# Patient Record
Sex: FEMALE | Race: WHITE | NOT HISPANIC OR LATINO | Employment: FULL TIME | URBAN - METROPOLITAN AREA
[De-identification: names, ages, dates, MRNs, and addresses within clinical notes are randomized per-mention and may not be internally consistent; named-entity substitution may affect disease eponyms.]

---

## 2023-11-23 PROBLEM — Z80.41 FAMILY HISTORY OF OVARIAN CANCER: Status: ACTIVE | Noted: 2023-11-23

## 2023-11-23 PROBLEM — Z80.0 FAMILY HISTORY OF COLON CANCER: Status: ACTIVE | Noted: 2023-11-23

## 2023-11-23 PROBLEM — Z01.419 ENCOUNTER FOR ANNUAL ROUTINE GYNECOLOGICAL EXAMINATION: Status: ACTIVE | Noted: 2023-11-23

## 2023-11-23 ASSESSMENT — ENCOUNTER SYMPTOMS
BACK PAIN: 0
CHILLS: 0
AGITATION: 0
FATIGUE: 0
COUGH: 0
FEVER: 0
CONSTIPATION: 0
FREQUENCY: 0
FLANK PAIN: 0
DYSURIA: 0
NAUSEA: 0
BLOOD IN STOOL: 0
DYSPHORIC MOOD: 0
DIFFICULTY URINATING: 0
DIZZINESS: 0
NERVOUS/ANXIOUS: 0
HEADACHES: 0
CHEST TIGHTNESS: 0
SLEEP DISTURBANCE: 0
HEMATURIA: 0
MYALGIAS: 0
JOINT SWELLING: 0
ADENOPATHY: 0
UNEXPECTED WEIGHT CHANGE: 0
ABDOMINAL PAIN: 0
DIARRHEA: 0
VOMITING: 0
SHORTNESS OF BREATH: 0

## 2023-11-23 NOTE — PROGRESS NOTES
Subjective   Patient ID: Sandy Phelps is a 48 y.o. female who presents for No chief complaint on file..  HPI 48 y.o. G0 on OCP sexually active? Here for yearly.   C/O shrinking in weight.  Mom had ovarian and colon cancer.  She is planning to have her ovaries removed at menopause.   Pap and HPV in 11/2021 negative. Colon cancer screening not done. No C/O.     Review of Systems   Constitutional:  Negative for chills, fatigue, fever and unexpected weight change.   Respiratory:  Negative for cough, chest tightness and shortness of breath.    Cardiovascular:  Negative for chest pain.   Gastrointestinal:  Negative for abdominal pain, blood in stool, constipation, diarrhea, nausea and vomiting.   Genitourinary:  Negative for decreased urine volume, difficulty urinating, dysuria, enuresis, flank pain, frequency, genital sores, hematuria and urgency.   Musculoskeletal:  Negative for back pain, joint swelling and myalgias.   Skin:  Negative for rash.   Neurological:  Negative for dizziness and headaches.   Hematological:  Negative for adenopathy.   Psychiatric/Behavioral:  Negative for agitation, dysphoric mood and sleep disturbance. The patient is not nervous/anxious.        Objective   Physical Exam  Vitals reviewed.   Constitutional:       Appearance: Normal appearance.   HENT:      Head: Normocephalic and atraumatic.      Nose: Nose normal.   Cardiovascular:      Rate and Rhythm: Normal rate and regular rhythm.   Pulmonary:      Effort: Pulmonary effort is normal.      Breath sounds: Normal breath sounds.   Chest:      Chest wall: No mass.   Breasts:     Right: Normal.      Left: Normal.   Abdominal:      General: Abdomen is flat. Bowel sounds are normal. There is no distension.      Palpations: Abdomen is soft. There is no mass.   Genitourinary:     General: Normal vulva.      Vagina: Normal.      Cervix: Normal.      Uterus: Normal.       Adnexa: Right adnexa normal and left adnexa normal.      Rectum: Normal.    Musculoskeletal:         General: Normal range of motion.      Cervical back: Normal range of motion.   Skin:     General: Skin is warm and dry.   Neurological:      General: No focal deficit present.      Mental Status: She is alert.   Psychiatric:         Mood and Affect: Mood normal.         Behavior: Behavior normal.         Assessment/Plan   Problem List Items Addressed This Visit             ICD-10-CM    Encounter for annual routine gynecological examination - Primary Z01.419    Family history of ovarian cancer Z80.41    Family history of colon cancer Z80.0   No Pap smear was done, and mammogram was ordered, I believe she may have a cyst on the right ovary maybe 3 to 4 cm.  Pelvic ultrasound was ordered to try to schedule that as soon as possible this week.  She is losing weight with diet and exercise.  Her goal is to be around 140s.  I put a referral for colon cancer screening.  She was encouraged to do self breast exam monthly.  We will see what the ultrasound result is but the goal is to schedule laparoscopic hysterectomy with bilateral salpingo-oophorectomy due to family history soon.

## 2023-11-27 ENCOUNTER — ANCILLARY PROCEDURE (OUTPATIENT)
Dept: RADIOLOGY | Facility: CLINIC | Age: 48
End: 2023-11-27
Payer: COMMERCIAL

## 2023-11-27 ENCOUNTER — OFFICE VISIT (OUTPATIENT)
Dept: OBSTETRICS AND GYNECOLOGY | Facility: CLINIC | Age: 48
End: 2023-11-27
Payer: COMMERCIAL

## 2023-11-27 VITALS
HEIGHT: 67 IN | BODY MASS INDEX: 28.03 KG/M2 | WEIGHT: 178.6 LBS | SYSTOLIC BLOOD PRESSURE: 122 MMHG | DIASTOLIC BLOOD PRESSURE: 80 MMHG

## 2023-11-27 DIAGNOSIS — Z80.41 FAMILY HISTORY OF OVARIAN CANCER: ICD-10-CM

## 2023-11-27 DIAGNOSIS — Z01.419 ENCOUNTER FOR ANNUAL ROUTINE GYNECOLOGICAL EXAMINATION: Primary | ICD-10-CM

## 2023-11-27 DIAGNOSIS — N83.201 OVARIAN CYST, RIGHT: ICD-10-CM

## 2023-11-27 DIAGNOSIS — Z80.0 FAMILY HISTORY OF COLON CANCER: ICD-10-CM

## 2023-11-27 PROCEDURE — 76830 TRANSVAGINAL US NON-OB: CPT | Performed by: RADIOLOGY

## 2023-11-27 PROCEDURE — 1036F TOBACCO NON-USER: CPT | Performed by: OBSTETRICS & GYNECOLOGY

## 2023-11-27 PROCEDURE — 76856 US EXAM PELVIC COMPLETE: CPT | Performed by: RADIOLOGY

## 2023-11-27 PROCEDURE — 99396 PREV VISIT EST AGE 40-64: CPT | Performed by: OBSTETRICS & GYNECOLOGY

## 2023-11-27 PROCEDURE — 76856 US EXAM PELVIC COMPLETE: CPT

## 2023-11-27 RX ORDER — PSYLLIUM HUSK 3.4 G/5.8G
POWDER ORAL
COMMUNITY

## 2023-11-27 RX ORDER — LEVONORGESTREL AND ETHINYL ESTRADIOL 0.1-0.02MG
1 KIT ORAL DAILY
COMMUNITY
End: 2023-11-27 | Stop reason: SDUPTHER

## 2023-11-27 RX ORDER — PERPHENAZINE 2 MG
TABLET ORAL
COMMUNITY

## 2023-11-27 RX ORDER — CHOLECALCIFEROL (VITAMIN D3) 125 MCG
CAPSULE ORAL
COMMUNITY
End: 2024-02-28 | Stop reason: HOSPADM

## 2023-11-27 RX ORDER — LEVONORGESTREL AND ETHINYL ESTRADIOL 0.1-0.02MG
1 KIT ORAL DAILY
Qty: 28 TABLET | Refills: 12 | Status: SHIPPED | OUTPATIENT
Start: 2023-11-27 | End: 2024-02-28 | Stop reason: HOSPADM

## 2023-11-27 RX ORDER — CALCIUM CARBONATE/VITAMIN D3 600 MG-20
TABLET,CHEWABLE ORAL
COMMUNITY

## 2023-12-06 ENCOUNTER — ANCILLARY PROCEDURE (OUTPATIENT)
Dept: RADIOLOGY | Facility: CLINIC | Age: 48
End: 2023-12-06
Payer: COMMERCIAL

## 2023-12-06 VITALS — WEIGHT: 180 LBS | HEIGHT: 66 IN | BODY MASS INDEX: 28.93 KG/M2

## 2023-12-06 DIAGNOSIS — Z01.419 ENCOUNTER FOR ANNUAL ROUTINE GYNECOLOGICAL EXAMINATION: ICD-10-CM

## 2023-12-06 PROCEDURE — 77067 SCR MAMMO BI INCL CAD: CPT | Performed by: RADIOLOGY

## 2023-12-06 PROCEDURE — 77067 SCR MAMMO BI INCL CAD: CPT

## 2023-12-06 PROCEDURE — 77063 BREAST TOMOSYNTHESIS BI: CPT | Performed by: RADIOLOGY

## 2023-12-13 ENCOUNTER — PREP FOR PROCEDURE (OUTPATIENT)
Dept: OBSTETRICS AND GYNECOLOGY | Facility: CLINIC | Age: 48
End: 2023-12-13
Payer: COMMERCIAL

## 2023-12-13 ENCOUNTER — TELEPHONE (OUTPATIENT)
Dept: OBSTETRICS AND GYNECOLOGY | Facility: CLINIC | Age: 48
End: 2023-12-13
Payer: COMMERCIAL

## 2023-12-13 DIAGNOSIS — Z80.41 FAMILY HISTORY OF OVARIAN CANCER: Primary | ICD-10-CM

## 2023-12-13 NOTE — TELEPHONE ENCOUNTER
Patient was recently in for annual visit with Dr. Morales, They briefly discussed getting her ovaries removed, she is asking what the next steps would be for surgery? Please advise if she needs an visit to discuss further?

## 2023-12-13 NOTE — TELEPHONE ENCOUNTER
SHERINE BSO was discussed at patient last visit with you on 11/27/23. Please advise how you would like to proceed.

## 2023-12-14 ENCOUNTER — HOSPITAL ENCOUNTER (OUTPATIENT)
Dept: RADIOLOGY | Facility: EXTERNAL LOCATION | Age: 48
Discharge: HOME | End: 2023-12-14

## 2023-12-14 DIAGNOSIS — N63.20 MASS OF LEFT BREAST, UNSPECIFIED QUADRANT: Primary | ICD-10-CM

## 2023-12-15 ENCOUNTER — DOCUMENTATION (OUTPATIENT)
Dept: OBSTETRICS AND GYNECOLOGY | Facility: CLINIC | Age: 48
End: 2023-12-15
Payer: COMMERCIAL

## 2023-12-15 NOTE — PROGRESS NOTES
RE: Surgery  Received: Today  MD Nereyda Mckinley  Caller: Unspecified (2 days ago, 11:46 AM)  Thank you          Previous Messages    Medical Advice/Question  (Newest Message First)  View All Conversations on this Encounter  Elisa Morales MD  You3 hours ago (9:56 AM)       Thank you     You  Allison Reeves; Elisa Morales MDYesterday (11:05 AM)       She is scheduled for surgery on 2/28/24 with a pre-op visit on 1/15 and a post op visit on 3/18     Allison A Ostetrico  YouYesterday (8:49 AM)     LO  Can you see if she is scheduled     Destinee Solorio RN  Allisonkatt Mcdanielo2 days ago       Can you please follow up on this. I don't know if Nila is aware of this patient.     Elisa Morales MD  [place] [id number] [facility]2 days ago       I put the request for surgery and put orders.  She can signs consent in the hospital.     Destinee Solorio, REX routed conversation to Elisa Morales MD2 days ago     Destinee Solorio RN2 days ago       H BSO was discussed at patient last visit with you on 11/27/23. Please advise how you would like to proceed.         Note        Chio Bill routed conversation to [place] [id number] [facility] days ago     Chio Bill2 days ago     AS  Patient was recently in for annual visit with Dr. Morales, They briefly discussed getting her ovaries removed, she is asking what the next steps would be for surgery? Please advise if she needs an visit to discuss further?         Note

## 2023-12-21 ENCOUNTER — HOSPITAL ENCOUNTER (OUTPATIENT)
Dept: RADIOLOGY | Facility: HOSPITAL | Age: 48
Discharge: HOME | End: 2023-12-21
Payer: COMMERCIAL

## 2023-12-21 DIAGNOSIS — N63.20 MASS OF LEFT BREAST, UNSPECIFIED QUADRANT: ICD-10-CM

## 2023-12-21 PROCEDURE — 76642 ULTRASOUND BREAST LIMITED: CPT | Mod: LT

## 2023-12-21 PROCEDURE — 76642 ULTRASOUND BREAST LIMITED: CPT | Mod: LEFT SIDE | Performed by: RADIOLOGY

## 2023-12-21 PROCEDURE — 76982 USE 1ST TARGET LESION: CPT | Mod: LT

## 2024-01-15 ENCOUNTER — OFFICE VISIT (OUTPATIENT)
Dept: OBSTETRICS AND GYNECOLOGY | Facility: CLINIC | Age: 49
End: 2024-01-15
Payer: COMMERCIAL

## 2024-01-15 VITALS
BODY MASS INDEX: 27.94 KG/M2 | DIASTOLIC BLOOD PRESSURE: 88 MMHG | WEIGHT: 178 LBS | HEIGHT: 67 IN | SYSTOLIC BLOOD PRESSURE: 138 MMHG

## 2024-01-15 DIAGNOSIS — Z15.02 GENETIC PREDISPOSITION TO OVARIAN CANCER: Primary | ICD-10-CM

## 2024-01-15 DIAGNOSIS — D25.1 INTRAMURAL AND SUBMUCOUS LEIOMYOMA OF UTERUS: ICD-10-CM

## 2024-01-15 DIAGNOSIS — D25.0 INTRAMURAL AND SUBMUCOUS LEIOMYOMA OF UTERUS: ICD-10-CM

## 2024-01-15 PROCEDURE — 99213 OFFICE O/P EST LOW 20 MIN: CPT | Performed by: OBSTETRICS & GYNECOLOGY

## 2024-01-15 PROCEDURE — 1036F TOBACCO NON-USER: CPT | Performed by: OBSTETRICS & GYNECOLOGY

## 2024-01-15 NOTE — PROGRESS NOTES
Subjective   Patient ID: Sandy Phelps is a 48 y.o. female who presents for No chief complaint on file..  KRIS Moran is a 48-year-old G0 female genetic predisposition to cancer who was seen here about a week ago  here to sign up for surgery.  Her ultrasound showed uterine fibroid and ovaries normal.  Overall size of uterus is 8 cm.  Due to her mom diagnosed with ovarian cancer and positive genetic predisposition in patient herself and her mom and other relatives ( not BRAC genes ) she has decided to proceed with prophylactic hysterectomy and bilateral oophorectomy.  Her mom was with her today and they both all their questions.    Review of Systems   All other systems reviewed and are negative.      Objective   Physical Exam  Constitutional:       General: Distressed: gyn.      Appearance: Normal appearance.   HENT:      Head: Normocephalic and atraumatic.   Cardiovascular:      Rate and Rhythm: Normal rate and regular rhythm.   Pulmonary:      Effort: Pulmonary effort is normal.      Breath sounds: Normal breath sounds.   Abdominal:      General: Abdomen is flat. Bowel sounds are normal.      Palpations: Abdomen is soft.   Musculoskeletal:      Cervical back: Normal range of motion and neck supple.   Skin:     General: Skin is warm and dry.   Neurological:      General: No focal deficit present.      Mental Status: She is alert.   Psychiatric:         Mood and Affect: Mood normal.         Behavior: Behavior normal.         Assessment/Plan   Problem List Items Addressed This Visit             ICD-10-CM    Genetic predisposition to ovarian cancer - Primary Z15.02    Intramural and submucous leiomyoma of uterus D25.1, D25.0   She is already scheduled for laparoscopic total hysterectomy and bilateral salpingo-oophorectomy.  We reviewed the details of surgery as well as risks including infection bleeding blood transfusion injury to nearby organs.  We did answer all her questions regarding care intraoperative events,  postoperative recovery and she is satisfied and is a scheduled to proceed.  She has already signed the consent form.         Elisa Morales MD 01/15/24 12:25 PM

## 2024-02-26 ENCOUNTER — ANESTHESIA EVENT (OUTPATIENT)
Dept: OPERATING ROOM | Facility: HOSPITAL | Age: 49
End: 2024-02-26
Payer: COMMERCIAL

## 2024-02-28 ENCOUNTER — HOSPITAL ENCOUNTER (OUTPATIENT)
Facility: HOSPITAL | Age: 49
Setting detail: OUTPATIENT SURGERY
Discharge: HOME | End: 2024-02-28
Attending: OBSTETRICS & GYNECOLOGY | Admitting: OBSTETRICS & GYNECOLOGY
Payer: COMMERCIAL

## 2024-02-28 ENCOUNTER — APPOINTMENT (OUTPATIENT)
Dept: CARDIOLOGY | Facility: HOSPITAL | Age: 49
End: 2024-02-28
Payer: COMMERCIAL

## 2024-02-28 ENCOUNTER — PHARMACY VISIT (OUTPATIENT)
Dept: PHARMACY | Facility: CLINIC | Age: 49
End: 2024-02-28
Payer: COMMERCIAL

## 2024-02-28 ENCOUNTER — ANESTHESIA (OUTPATIENT)
Dept: OPERATING ROOM | Facility: HOSPITAL | Age: 49
End: 2024-02-28
Payer: COMMERCIAL

## 2024-02-28 VITALS
SYSTOLIC BLOOD PRESSURE: 141 MMHG | BODY MASS INDEX: 27.94 KG/M2 | HEIGHT: 67 IN | OXYGEN SATURATION: 98 % | HEART RATE: 64 BPM | RESPIRATION RATE: 11 BRPM | DIASTOLIC BLOOD PRESSURE: 78 MMHG | WEIGHT: 178 LBS | TEMPERATURE: 97.8 F

## 2024-02-28 DIAGNOSIS — G89.18 POSTOPERATIVE PAIN: ICD-10-CM

## 2024-02-28 DIAGNOSIS — Z80.41 FAMILY HISTORY OF OVARIAN CANCER: Primary | ICD-10-CM

## 2024-02-28 LAB
ANION GAP SERPL CALC-SCNC: 16 MMOL/L (ref 10–20)
ATRIAL RATE: 83 BPM
BUN SERPL-MCNC: 17 MG/DL (ref 6–23)
CALCIUM SERPL-MCNC: 9.2 MG/DL (ref 8.6–10.3)
CHLORIDE SERPL-SCNC: 105 MMOL/L (ref 98–107)
CO2 SERPL-SCNC: 20 MMOL/L (ref 21–32)
CREAT SERPL-MCNC: 0.79 MG/DL (ref 0.5–1.05)
EGFRCR SERPLBLD CKD-EPI 2021: >90 ML/MIN/1.73M*2
ERYTHROCYTE [DISTWIDTH] IN BLOOD BY AUTOMATED COUNT: 12.1 % (ref 11.5–14.5)
GLUCOSE SERPL-MCNC: 90 MG/DL (ref 74–99)
HCT VFR BLD AUTO: 43.6 % (ref 36–46)
HGB BLD-MCNC: 15.2 G/DL (ref 12–16)
MCH RBC QN AUTO: 31.4 PG (ref 26–34)
MCHC RBC AUTO-ENTMCNC: 34.9 G/DL (ref 32–36)
MCV RBC AUTO: 90 FL (ref 80–100)
NRBC BLD-RTO: 0 /100 WBCS (ref 0–0)
P AXIS: 35 DEGREES
PLATELET # BLD AUTO: 243 X10*3/UL (ref 150–450)
POTASSIUM SERPL-SCNC: 3.7 MMOL/L (ref 3.5–5.3)
PR INTERVAL: 144 MS
PREGNANCY TEST URINE, POC: NEGATIVE
Q ONSET: 253 MS
QRS COUNT: 14 BEATS
QRS DURATION: 85 MS
QT INTERVAL: 364 MS
QTC CALCULATION(BAZETT): 428 MS
QTC FREDERICIA: 405 MS
R AXIS: 65 DEGREES
RBC # BLD AUTO: 4.84 X10*6/UL (ref 4–5.2)
SODIUM SERPL-SCNC: 137 MMOL/L (ref 136–145)
T AXIS: 10 DEGREES
T OFFSET: 435 MS
VENTRICULAR RATE: 83 BPM
WBC # BLD AUTO: 4.4 X10*3/UL (ref 4.4–11.3)

## 2024-02-28 PROCEDURE — 93010 ELECTROCARDIOGRAM REPORT: CPT | Performed by: INTERNAL MEDICINE

## 2024-02-28 PROCEDURE — RXMED WILLOW AMBULATORY MEDICATION CHARGE

## 2024-02-28 PROCEDURE — 2720000007 HC OR 272 NO HCPCS: Performed by: OBSTETRICS & GYNECOLOGY

## 2024-02-28 PROCEDURE — 36415 COLL VENOUS BLD VENIPUNCTURE: CPT | Performed by: ANESTHESIOLOGY

## 2024-02-28 PROCEDURE — 7100000009 HC PHASE TWO TIME - INITIAL BASE CHARGE: Performed by: OBSTETRICS & GYNECOLOGY

## 2024-02-28 PROCEDURE — 85027 COMPLETE CBC AUTOMATED: CPT | Performed by: ANESTHESIOLOGY

## 2024-02-28 PROCEDURE — 88307 TISSUE EXAM BY PATHOLOGIST: CPT | Mod: TC,PORLAB,WESLAB | Performed by: OBSTETRICS & GYNECOLOGY

## 2024-02-28 PROCEDURE — 7100000001 HC RECOVERY ROOM TIME - INITIAL BASE CHARGE: Performed by: OBSTETRICS & GYNECOLOGY

## 2024-02-28 PROCEDURE — 7100000010 HC PHASE TWO TIME - EACH INCREMENTAL 1 MINUTE: Performed by: OBSTETRICS & GYNECOLOGY

## 2024-02-28 PROCEDURE — 2500000004 HC RX 250 GENERAL PHARMACY W/ HCPCS (ALT 636 FOR OP/ED): Performed by: NURSE ANESTHETIST, CERTIFIED REGISTERED

## 2024-02-28 PROCEDURE — 3700000001 HC GENERAL ANESTHESIA TIME - INITIAL BASE CHARGE: Performed by: OBSTETRICS & GYNECOLOGY

## 2024-02-28 PROCEDURE — 3600000009 HC OR TIME - EACH INCREMENTAL 1 MINUTE - PROCEDURE LEVEL FOUR: Performed by: OBSTETRICS & GYNECOLOGY

## 2024-02-28 PROCEDURE — 2500000005 HC RX 250 GENERAL PHARMACY W/O HCPCS

## 2024-02-28 PROCEDURE — 7100000002 HC RECOVERY ROOM TIME - EACH INCREMENTAL 1 MINUTE: Performed by: OBSTETRICS & GYNECOLOGY

## 2024-02-28 PROCEDURE — 80048 BASIC METABOLIC PNL TOTAL CA: CPT | Performed by: ANESTHESIOLOGY

## 2024-02-28 PROCEDURE — 58571 TLH W/T/O 250 G OR LESS: CPT | Performed by: OBSTETRICS & GYNECOLOGY

## 2024-02-28 PROCEDURE — 93005 ELECTROCARDIOGRAM TRACING: CPT | Mod: 59

## 2024-02-28 PROCEDURE — 2500000001 HC RX 250 WO HCPCS SELF ADMINISTERED DRUGS (ALT 637 FOR MEDICARE OP): Performed by: ANESTHESIOLOGY

## 2024-02-28 PROCEDURE — 88307 TISSUE EXAM BY PATHOLOGIST: CPT | Performed by: STUDENT IN AN ORGANIZED HEALTH CARE EDUCATION/TRAINING PROGRAM

## 2024-02-28 PROCEDURE — 3600000004 HC OR TIME - INITIAL BASE CHARGE - PROCEDURE LEVEL FOUR: Performed by: OBSTETRICS & GYNECOLOGY

## 2024-02-28 PROCEDURE — 2500000005 HC RX 250 GENERAL PHARMACY W/O HCPCS: Performed by: ANESTHESIOLOGY

## 2024-02-28 PROCEDURE — 3700000002 HC GENERAL ANESTHESIA TIME - EACH INCREMENTAL 1 MINUTE: Performed by: OBSTETRICS & GYNECOLOGY

## 2024-02-28 PROCEDURE — 81025 URINE PREGNANCY TEST: CPT | Performed by: ANESTHESIOLOGY

## 2024-02-28 PROCEDURE — 2500000004 HC RX 250 GENERAL PHARMACY W/ HCPCS (ALT 636 FOR OP/ED): Performed by: ANESTHESIOLOGY

## 2024-02-28 PROCEDURE — 2500000004 HC RX 250 GENERAL PHARMACY W/ HCPCS (ALT 636 FOR OP/ED)

## 2024-02-28 RX ORDER — ACETAMINOPHEN 325 MG/1
975 TABLET ORAL ONCE
Status: COMPLETED | OUTPATIENT
Start: 2024-02-28 | End: 2024-02-28

## 2024-02-28 RX ORDER — PROPOFOL 10 MG/ML
INJECTION, EMULSION INTRAVENOUS AS NEEDED
Status: DISCONTINUED | OUTPATIENT
Start: 2024-02-28 | End: 2024-02-28

## 2024-02-28 RX ORDER — FAMOTIDINE 10 MG/ML
20 INJECTION INTRAVENOUS ONCE
Status: COMPLETED | OUTPATIENT
Start: 2024-02-28 | End: 2024-02-28

## 2024-02-28 RX ORDER — METOCLOPRAMIDE HYDROCHLORIDE 5 MG/ML
10 INJECTION INTRAMUSCULAR; INTRAVENOUS ONCE
Status: COMPLETED | OUTPATIENT
Start: 2024-02-28 | End: 2024-02-28

## 2024-02-28 RX ORDER — CEFAZOLIN 1 G/1
INJECTION, POWDER, FOR SOLUTION INTRAVENOUS AS NEEDED
Status: DISCONTINUED | OUTPATIENT
Start: 2024-02-28 | End: 2024-02-28

## 2024-02-28 RX ORDER — ROCURONIUM BROMIDE 50 MG/5 ML
SYRINGE (ML) INTRAVENOUS AS NEEDED
Status: DISCONTINUED | OUTPATIENT
Start: 2024-02-28 | End: 2024-02-28

## 2024-02-28 RX ORDER — OXYCODONE HYDROCHLORIDE 5 MG/1
5 TABLET ORAL EVERY 6 HOURS PRN
Status: CANCELLED | OUTPATIENT
Start: 2024-02-28

## 2024-02-28 RX ORDER — KETOROLAC TROMETHAMINE 30 MG/ML
INJECTION, SOLUTION INTRAMUSCULAR; INTRAVENOUS AS NEEDED
Status: DISCONTINUED | OUTPATIENT
Start: 2024-02-28 | End: 2024-02-28

## 2024-02-28 RX ORDER — SODIUM CITRATE AND CITRIC ACID MONOHYDRATE 334; 500 MG/5ML; MG/5ML
30 SOLUTION ORAL ONCE
Status: COMPLETED | OUTPATIENT
Start: 2024-02-28 | End: 2024-02-28

## 2024-02-28 RX ORDER — MIDAZOLAM HYDROCHLORIDE 1 MG/ML
INJECTION, SOLUTION INTRAMUSCULAR; INTRAVENOUS AS NEEDED
Status: DISCONTINUED | OUTPATIENT
Start: 2024-02-28 | End: 2024-02-28

## 2024-02-28 RX ORDER — IBUPROFEN 400 MG/1
800 TABLET ORAL EVERY 8 HOURS PRN
Qty: 21 TABLET | Refills: 1 | Status: SHIPPED | OUTPATIENT
Start: 2024-02-28 | End: 2024-03-18 | Stop reason: WASHOUT

## 2024-02-28 RX ORDER — FENTANYL CITRATE 50 UG/ML
INJECTION, SOLUTION INTRAMUSCULAR; INTRAVENOUS AS NEEDED
Status: DISCONTINUED | OUTPATIENT
Start: 2024-02-28 | End: 2024-02-28

## 2024-02-28 RX ORDER — LIDOCAINE HCL/PF 100 MG/5ML
SYRINGE (ML) INTRAVENOUS AS NEEDED
Status: DISCONTINUED | OUTPATIENT
Start: 2024-02-28 | End: 2024-02-28

## 2024-02-28 RX ORDER — DEXAMETHASONE SODIUM PHOSPHATE 4 MG/ML
8 INJECTION, SOLUTION INTRA-ARTICULAR; INTRALESIONAL; INTRAMUSCULAR; INTRAVENOUS; SOFT TISSUE ONCE
Status: COMPLETED | OUTPATIENT
Start: 2024-02-28 | End: 2024-02-28

## 2024-02-28 RX ORDER — ONDANSETRON HYDROCHLORIDE 2 MG/ML
4 INJECTION, SOLUTION INTRAVENOUS ONCE
Status: COMPLETED | OUTPATIENT
Start: 2024-02-28 | End: 2024-02-28

## 2024-02-28 RX ORDER — CEFAZOLIN SODIUM 2 G/100ML
2 INJECTION, SOLUTION INTRAVENOUS ONCE
Status: DISCONTINUED | OUTPATIENT
Start: 2024-02-28 | End: 2024-02-28 | Stop reason: HOSPADM

## 2024-02-28 RX ORDER — SODIUM CHLORIDE, SODIUM LACTATE, POTASSIUM CHLORIDE, CALCIUM CHLORIDE 600; 310; 30; 20 MG/100ML; MG/100ML; MG/100ML; MG/100ML
50 INJECTION, SOLUTION INTRAVENOUS CONTINUOUS
Status: DISCONTINUED | OUTPATIENT
Start: 2024-02-28 | End: 2024-02-28 | Stop reason: HOSPADM

## 2024-02-28 RX ORDER — ONDANSETRON HYDROCHLORIDE 2 MG/ML
4 INJECTION, SOLUTION INTRAVENOUS ONCE AS NEEDED
Status: DISCONTINUED | OUTPATIENT
Start: 2024-02-28 | End: 2024-02-28 | Stop reason: HOSPADM

## 2024-02-28 RX ORDER — OXYCODONE HYDROCHLORIDE 5 MG/1
5 TABLET ORAL EVERY 6 HOURS PRN
Qty: 12 TABLET | Refills: 0 | Status: SHIPPED | OUTPATIENT
Start: 2024-02-28 | End: 2024-03-18 | Stop reason: WASHOUT

## 2024-02-28 RX ADMIN — Medication 10 MG: at 13:35

## 2024-02-28 RX ADMIN — FENTANYL CITRATE 50 MCG: 50 INJECTION INTRAMUSCULAR; INTRAVENOUS at 14:18

## 2024-02-28 RX ADMIN — FENTANYL CITRATE 50 MCG: 50 INJECTION INTRAMUSCULAR; INTRAVENOUS at 14:28

## 2024-02-28 RX ADMIN — FENTANYL CITRATE 50 MCG: 50 INJECTION INTRAMUSCULAR; INTRAVENOUS at 14:15

## 2024-02-28 RX ADMIN — Medication: at 12:39

## 2024-02-28 RX ADMIN — LIDOCAINE HYDROCHLORIDE 80 MG: 20 INJECTION, SOLUTION INTRAVENOUS at 12:50

## 2024-02-28 RX ADMIN — SUGAMMADEX 200 MG: 100 INJECTION, SOLUTION INTRAVENOUS at 14:22

## 2024-02-28 RX ADMIN — Medication 50 MG: at 12:50

## 2024-02-28 RX ADMIN — SODIUM CHLORIDE, POTASSIUM CHLORIDE, SODIUM LACTATE AND CALCIUM CHLORIDE 50 ML/HR: 600; 310; 30; 20 INJECTION, SOLUTION INTRAVENOUS at 12:39

## 2024-02-28 RX ADMIN — FAMOTIDINE 20 MG: 10 INJECTION, SOLUTION INTRAVENOUS at 12:40

## 2024-02-28 RX ADMIN — ONDANSETRON 4 MG: 2 INJECTION INTRAMUSCULAR; INTRAVENOUS at 12:35

## 2024-02-28 RX ADMIN — METOCLOPRAMIDE 10 MG: 5 INJECTION, SOLUTION INTRAMUSCULAR; INTRAVENOUS at 12:35

## 2024-02-28 RX ADMIN — ACETAMINOPHEN 975 MG: 325 TABLET ORAL at 12:36

## 2024-02-28 RX ADMIN — FENTANYL CITRATE 50 MCG: 50 INJECTION INTRAMUSCULAR; INTRAVENOUS at 12:50

## 2024-02-28 RX ADMIN — SODIUM CITRATE AND CITRIC ACID MONOHYDRATE 30 ML: 500; 334 SOLUTION ORAL at 12:35

## 2024-02-28 RX ADMIN — KETOROLAC TROMETHAMINE 30 MG: 30 INJECTION, SOLUTION INTRAMUSCULAR at 14:22

## 2024-02-28 RX ADMIN — PROPOFOL 200 MG: 10 INJECTION, EMULSION INTRAVENOUS at 12:50

## 2024-02-28 RX ADMIN — MIDAZOLAM 2 MG: 1 INJECTION INTRAMUSCULAR; INTRAVENOUS at 12:43

## 2024-02-28 RX ADMIN — HYDROMORPHONE HYDROCHLORIDE 0.5 MG: 0.5 INJECTION, SOLUTION INTRAMUSCULAR; INTRAVENOUS; SUBCUTANEOUS at 14:57

## 2024-02-28 RX ADMIN — DEXAMETHASONE SODIUM PHOSPHATE 8 MG: 4 INJECTION, SOLUTION INTRAMUSCULAR; INTRAVENOUS at 12:35

## 2024-02-28 RX ADMIN — CEFAZOLIN 2 G: 1 INJECTION, POWDER, FOR SOLUTION INTRAMUSCULAR; INTRAVENOUS at 12:45

## 2024-02-28 RX ADMIN — HYDROMORPHONE HYDROCHLORIDE 0.2 MG: 0.2 INJECTION, SOLUTION INTRAMUSCULAR; INTRAVENOUS; SUBCUTANEOUS at 15:08

## 2024-02-28 RX ADMIN — HYDROMORPHONE HYDROCHLORIDE 0.2 MG: 0.2 INJECTION, SOLUTION INTRAMUSCULAR; INTRAVENOUS; SUBCUTANEOUS at 15:38

## 2024-02-28 SDOH — HEALTH STABILITY: MENTAL HEALTH: CURRENT SMOKER: 0

## 2024-02-28 ASSESSMENT — PAIN - FUNCTIONAL ASSESSMENT
PAIN_FUNCTIONAL_ASSESSMENT: 0-10
PAIN_FUNCTIONAL_ASSESSMENT: FLACC (FACE, LEGS, ACTIVITY, CRY, CONSOLABILITY)
PAIN_FUNCTIONAL_ASSESSMENT: FLACC (FACE, LEGS, ACTIVITY, CRY, CONSOLABILITY)
PAIN_FUNCTIONAL_ASSESSMENT: 0-10

## 2024-02-28 ASSESSMENT — COLUMBIA-SUICIDE SEVERITY RATING SCALE - C-SSRS
6. HAVE YOU EVER DONE ANYTHING, STARTED TO DO ANYTHING, OR PREPARED TO DO ANYTHING TO END YOUR LIFE?: NO
2. HAVE YOU ACTUALLY HAD ANY THOUGHTS OF KILLING YOURSELF?: NO
1. IN THE PAST MONTH, HAVE YOU WISHED YOU WERE DEAD OR WISHED YOU COULD GO TO SLEEP AND NOT WAKE UP?: NO

## 2024-02-28 ASSESSMENT — PAIN SCALES - GENERAL
PAINLEVEL_OUTOF10: 0 - NO PAIN
PAINLEVEL_OUTOF10: 0 - NO PAIN
PAINLEVEL_OUTOF10: 4
PAINLEVEL_OUTOF10: 3
PAINLEVEL_OUTOF10: 4
PAINLEVEL_OUTOF10: 4
PAINLEVEL_OUTOF10: 3
PAINLEVEL_OUTOF10: 6
PAINLEVEL_OUTOF10: 8
PAIN_LEVEL: 3
PAINLEVEL_OUTOF10: 0 - NO PAIN
PAINLEVEL_OUTOF10: 3
PAINLEVEL_OUTOF10: 3

## 2024-02-28 ASSESSMENT — PAIN DESCRIPTION - DESCRIPTORS
DESCRIPTORS: CRAMPING
DESCRIPTORS: CRAMPING

## 2024-02-28 ASSESSMENT — PAIN DESCRIPTION - LOCATION
LOCATION: ABDOMEN

## 2024-02-28 NOTE — DISCHARGE INSTRUCTIONS
Ice to incisions x 24 hours, light regular activities asb tolerated no heavy work or lifting or sex for 6 weeks.  Call with fever, heavy vaginal bleeding, uncontrolled pain.

## 2024-02-28 NOTE — H&P
History Of Present Illness  Sandy Phelps is a 48 y.o. female presenting for TLH and BSO due to genetic predisposition to ovarian cancer and uterine fibroids.   She denies any changes to her medical history, allergies or medication.  A recent colonoscopy was negative and she did well. .     Past Medical History  Past Medical History:   Diagnosis Date    Personal history of other diseases of the female genital tract     History of menorrhagia       Surgical History  Past Surgical History:   Procedure Laterality Date    OTHER SURGICAL HISTORY  10/30/2019    Knee arthroscopy    OTHER SURGICAL HISTORY  10/30/2019    [place] tooth extraction    OTHER SURGICAL HISTORY Right 12/23/2019    Breast biopsy        Social History  She reports that she has never smoked. She has never used smokeless tobacco. She reports that she does not drink alcohol and does not use drugs.    Family History  Family History   Problem Relation Name Age of Onset    Colon cancer Mother      Ovarian cancer Mother      Other (bladder cancer) Father      Pancreatic cancer Maternal Grandmother          Allergies  Patient has no known allergies.    Review of Systems     Physical Exam     Last Recorded Vitals  There were no vitals taken for this visit.    Relevant Results        Assessment/Plan   Principal Problem:    Family history of ovarian cancer  Uterine fibroids  For TLH and BSO possible open       I spent 10  minutes in the professional and overall care of this patient.      Elisa Morales MD

## 2024-02-28 NOTE — OP NOTE
Date: 2024  OR Location: [facility]    Name: Sandy Phelps, : 1975, Age: 48 y.o., MRN: [id number], Sex: female    Diagnosis  Pre-op Diagnosis     * Family history of ovarian cancer [Z80.41] Post-op Diagnosis     * Family history of ovarian cancer [Z80.41]     Procedures  Hysterectomy Laparoscopy with Salpingo-Oophorectomy* DR. PINON TO ASSIST*, ALAN  99826 - HI LAPS TOTAL HYSTERECT 250 GM/< W/RMVL TUBE/OVARY      Surgeons      * Elisa Morales - Primary    Resident/Fellow/Other Assistant:  Surgeon(s) and Role:     * Jeri Pinon MD - Assisting    Procedure Summary  Anesthesia: General  ASA: II  Anesthesia Staff: CRNA: AROLDO Bellamy-CRNA  Estimated Blood Loss: 10 mL  Intra-op Medications:   Administrations occurring from 1300 to 1530 on 24:   Medication Name Total Dose   lactated Ringer's infusion Cannot be calculated              Anesthesia Record               Intraprocedure I/O Totals          Intake    lactated Ringer's infusion 1300.00 mL    Total Intake 1300 mL       Output    Urine 400 mL    Est. Blood Loss 10 mL    Total Output 410 mL       Net    Net Volume 890 mL          Specimen:   ID Type Source Tests Collected by Time   1 : UTERUS, CERVIX, BILATERAL FALLOPIAN TUBES, BILATERAL OVARIES Tissue UTERUS, CERVIX, FALLOPIAN TUBES AND OVARIES BILATERAL SURGICAL PATHOLOGY EXAM Elisa Morales MD 2024 1325        Staff:   Circulator: Caridad Burris RN         Procedure Details:  The patient was seen in the preoperative area. The site of surgery was properly noted/marked if necessary per policy. The patient has been actively warmed in preoperative area. Preoperative antibiotics have been ordered and given within 1 hours of incision. Venous thrombosis prophylaxis have been ordered including bilateral sequential compression devices  Patient was brought to the operating room, huddle was done, she was put under general anesthesia in dorsolithotomy position prepped and draped in usual  fashion.  A timeout was done.  A Reddy was inserted into the bladder, exam under anesthesia showed uterus small, small cervix stenotic, no adnexal masses.    A weighted speculum was inserted into the vagina cervix was visualized anterior lip was grasped with single-tooth tenaculum endocervical canal was gently dilated and a uterine manipulator and a cup was applied to the uterine cavity and cervix.  Rest of the instrument were removed from the vagina.    While I was working in the vaginal area, Dr. Key started the abdominal approach.  She made 5 mm incision infraumbilically with a scalpel using Veress needle pneumoperitoneum was obtained position was confirmed with the laparoscope through 5 mm trocar.  2 other incision were made on the patient's left lower quadrant of 10 to 12 mm, and on the patient's right lower quadrant a 5 mm and total cores were introduced into the abdomen under direct vision.  Survey of the pelvis and abdomen showed above findings.  Ureters were visualized with difficulty on each side due to endometriosis and pelvic adhesion.  It was noticed that the posterior uterus was adhesed to the cul-de-sac and rectal area.  This was gently with sharp and blunt dissection was .    Mild adhesion on the patient's right side was taken down with sharp dissection.  Fallopian tube on each side were pulled in the middle IP ligament was identified on each side cauterized with LigaSure and cut hemostasis was assured.  Next the round ligament on each side were cauterized with LigaSure and cut.  Bladder flap was created and bladder was pushed down with blunt and sharp dissection.  Uterine vessels on the patient's left side was skeletonized cauterized and cut hemostasis was assured.  Dr. Key performed the same procedure on the patient's right side.  Right ovary was adhesed to the pelvic sidewall this was dissected and the cyst was ruptured with endometriotic material exited.  She then cauterized  the right IP ligament and cut subsequently the round ligament on that side was cauterized and cut and once again bladder flap was created.  Uterine vessel on the left side were skeletonized cauterized and cut hemostasis was assured.    Using the Bovie vagina was cut right on the cup amputating the cervix and uterus from the vagina.  Specimen was pulled through.  Vaginal cuff was closed with V-Loc suture.  I then perform a rectal exam while watching through laparoscope and no rectal injury was noticed.    Copious irrigation was done no excess bleeding.  Gas was allowed to release all the instrument removed from the abdomen.  Counts were correct x 2.  Skin incision were closed off with 4-0 Vicryl.    Specimen was sent to pathology Reddy was discontinued.  She was sent to recovery room awake in stable condition.    Dr. Key's assistant was crucial to start the procedure abdominally while I was working in the vaginal area, help complete hysterectomy on the patient's right side, help with lysis of adhesion, hemostasis and closure of the vaginal cuff.      Findings: Small uterus, small fibroid, endometriosis with cul de sac adhesion, right ovarian endometrioma, normal left ovary, bowel adhesion to right abdominal wall    Complications:  None; patient tolerated the procedure well.     Disposition: PACU - hemodynamically stable.  Condition: stable

## 2024-02-28 NOTE — ANESTHESIA PROCEDURE NOTES
Airway  Date/Time: 2/28/2024 12:52 PM  Urgency: elective      Staffing  Performed: attending   Authorized by: ELISABETH Bellamy    Performed by: ELISABETH Bellamy  Patient location during procedure: OR    Indications and Patient Condition  Indications for airway management: anesthesia and airway protection  Spontaneous ventilation: present  Sedation level: deep  Preoxygenated: yes  Patient position: sniffing  MILS maintained throughout  Mask difficulty assessment: 1 - vent by mask    Final Airway Details  Final airway type: endotracheal airway      Successful airway: ETT  Cuffed: yes   Successful intubation technique: video laryngoscopy  Facilitating devices/methods: intubating stylet  Endotracheal tube insertion site: oral  Blade: Tonny  Blade size: #3  ETT size (mm): 7.0  Cormack-Lehane Classification: grade I - full view of glottis  Placement verified by: chest auscultation and capnometry   Measured from: lips  ETT to lips (cm): 22  Number of attempts at approach: 1

## 2024-02-28 NOTE — ANESTHESIA POSTPROCEDURE EVALUATION
Patient: Sandy Phelps    Procedure Summary       Date: 02/28/24 Room / Location: [facility] 02 / [facility]    Anesthesia Start: 1244 Anesthesia Stop: 1444    Procedure: Hysterectomy Laparoscopy with Salpingo-Oophorectomy* DR. PINON TO ASSIST*, ALAN Diagnosis:       Family history of ovarian cancer      (Family history of ovarian cancer [Z80.41])    Surgeons: Elisa Morales MD Responsible Provider: ELISABETH Bellamy    Anesthesia Type: general ASA Status: 2            Anesthesia Type: general    Vitals Value Taken Time   /77 02/28/24 1601   Temp 36.6 °C (97.8 °F) 02/28/24 1530   Pulse 64 02/28/24 1609   Resp 16 02/28/24 1609   SpO2 94 % 02/28/24 1609   Vitals shown include unvalidated device data.    Anesthesia Post Evaluation    Patient location during evaluation: PACU  Patient participation: complete - patient participated  Level of consciousness: awake and alert  Pain score: 3  Pain management: satisfactory to patient  Airway patency: patent  Cardiovascular status: acceptable  Respiratory status: acceptable  Hydration status: acceptable  Postoperative Nausea and Vomiting: none    No notable events documented.

## 2024-02-28 NOTE — ANESTHESIA PREPROCEDURE EVALUATION
Patient: Sandy Phelps    Procedure Information       Date/Time: 02/28/24 1300    Procedure: Hysterectomy Laparoscopy with Salpingo-Oophorectomy* DR. PINON TO ASSIST*    Location: [facility] 02 / [facility]    Surgeons: Elisa Morales MD            Relevant Problems   Anesthesia (within normal limits)      Cardiovascular (within normal limits)      Endocrine (within normal limits)      GI (within normal limits)      Pulmonary (within normal limits)       Clinical information reviewed:   Tobacco  Allergies  Meds  Problems  Med Hx  Surg Hx   Fam Hx  Soc   Hx        NPO Detail:  NPO/Void Status  Date of Last Liquid: 02/27/24  Time of Last Liquid: 2100  Date of Last Solid: 02/27/24  Time of Last Solid: 2100  Time of Last Void: 1154         Physical Exam    Airway  Mallampati: II  TM distance: >3 FB  Neck ROM: full     Cardiovascular - normal exam  Rhythm: regular  Rate: normal     Dental - normal exam     Pulmonary - normal exam     Abdominal - normal exam             Anesthesia Plan    History of general anesthesia?: yes  History of complications of general anesthesia?: no    ASA 2     general     The patient is not a current smoker.  Patient was not previously instructed to abstain from smoking on day of procedure.  Patient did not smoke on day of procedure.    Anesthetic plan and risks discussed with patient.  Use of blood products discussed with patient who consented to blood products.    Plan discussed with CRNA.

## 2024-03-04 ENCOUNTER — PHARMACY VISIT (OUTPATIENT)
Dept: PHARMACY | Facility: CLINIC | Age: 49
End: 2024-03-04

## 2024-03-04 PROCEDURE — RXMED WILLOW AMBULATORY MEDICATION CHARGE

## 2024-03-06 LAB
LABORATORY COMMENT REPORT: NORMAL
PATH REPORT.FINAL DX SPEC: NORMAL
PATH REPORT.GROSS SPEC: NORMAL
PATH REPORT.TOTAL CANCER: NORMAL

## 2024-03-18 ENCOUNTER — OFFICE VISIT (OUTPATIENT)
Dept: OBSTETRICS AND GYNECOLOGY | Facility: CLINIC | Age: 49
End: 2024-03-18
Payer: COMMERCIAL

## 2024-03-18 VITALS
DIASTOLIC BLOOD PRESSURE: 84 MMHG | HEIGHT: 67 IN | WEIGHT: 190 LBS | BODY MASS INDEX: 29.82 KG/M2 | SYSTOLIC BLOOD PRESSURE: 124 MMHG

## 2024-03-18 DIAGNOSIS — Z78.0 POSTMENOPAUSAL: Primary | ICD-10-CM

## 2024-03-18 DIAGNOSIS — Z09 POSTOP CHECK: ICD-10-CM

## 2024-03-18 PROCEDURE — 1036F TOBACCO NON-USER: CPT | Performed by: OBSTETRICS & GYNECOLOGY

## 2024-03-18 PROCEDURE — 99024 POSTOP FOLLOW-UP VISIT: CPT | Performed by: OBSTETRICS & GYNECOLOGY

## 2024-03-18 RX ORDER — ESTRADIOL 1 MG/1
1 TABLET ORAL DAILY
Qty: 30 TABLET | Refills: 11 | Status: SHIPPED | OUTPATIENT
Start: 2024-03-18 | End: 2025-03-18

## 2024-03-18 NOTE — PROGRESS NOTES
Subjective   Patient ID: Sandy Phelps is a 48 y.o. female who presents for No chief complaint on file..  KRIS Esposito is a 48 years old 3 weeks post total laparoscopic hysterectomy bilateral salpingo-oophorectomy here for postop check.  She denies any vaginal bleeding.  She says she had pain for couple of days but resolved she only took Motrin and now is back on her maintenance Aleve.  She has some questions regarding going back to activities and lifting and work that were addressed today.  We reviewed her pathology report which was completely benign.  She does not report any significant hot flashes but has had trouble sleeping.  We discussed pros and cons of estrogen replacement therapy and she would like to go ahead and try estrogen prescription.     Review of Systems    Objective   Physical Exam  Pulmonary:      Effort: Pulmonary effort is normal.   Abdominal:      General: Abdomen is flat.      Palpations: Abdomen is soft.      Comments: Incisions healed well.    Neurological:      Mental Status: She is alert.         Assessment/Plan   Problem List Items Addressed This Visit    None  Visit Diagnoses         Codes    Postmenopausal    -  Primary Z78.0    Relevant Medications    estradiol (Estrace) 1 mg tablet    Postop check     Z09        Estrace 1 mg escribed.  Pros and cons discussed.   Follow up in 4 weeks.          Elisa Morales MD 03/18/24 9:08 AM

## 2024-04-15 ENCOUNTER — OFFICE VISIT (OUTPATIENT)
Dept: OBSTETRICS AND GYNECOLOGY | Facility: CLINIC | Age: 49
End: 2024-04-15
Payer: COMMERCIAL

## 2024-04-15 VITALS — SYSTOLIC BLOOD PRESSURE: 118 MMHG | DIASTOLIC BLOOD PRESSURE: 82 MMHG | BODY MASS INDEX: 29.58 KG/M2 | WEIGHT: 190 LBS

## 2024-04-15 DIAGNOSIS — Z09 POSTOP CHECK: Primary | ICD-10-CM

## 2024-04-15 PROCEDURE — 1036F TOBACCO NON-USER: CPT | Performed by: OBSTETRICS & GYNECOLOGY

## 2024-04-15 PROCEDURE — 99024 POSTOP FOLLOW-UP VISIT: CPT | Performed by: OBSTETRICS & GYNECOLOGY

## 2024-04-15 NOTE — PROGRESS NOTES
Subjective   Patient ID: Sandy Phelps is a 48 y.o. female who presents for Post-op (Here for post op stated no concerns ).  HPI 28 years old here for recheck.  She is 6 weeks post total laparoscopic hysterectomy bilateral salpingo-oophorectomy on estrogen replacement therapy.  She denies any concern.  No vaginal bleeding no pain.  She reports normal bladder and bowel function.  We have previously reviewed her final pathology which were all benign.    Review of Systems   All other systems reviewed and are negative.      Objective   Physical Exam  Constitutional:       Appearance: Normal appearance.   Pulmonary:      Effort: Pulmonary effort is normal.   Abdominal:      General: Abdomen is flat.      Palpations: Abdomen is soft.   Genitourinary:     General: Normal vulva.      Vagina: Normal.      Comments: Vaginal cuff intact healing well.   No bleeding.   Neurological:      Mental Status: She is alert.         Assessment/Plan   Problem List Items Addressed This Visit    None  Visit Diagnoses         Codes    Postop check    -  Primary Z09          Exam normal she was reassured.  I have recommended She returns as needed or yearly.  Pap smear every 5 years.       Elisa Morales MD 04/15/24 11:00 AM

## 2024-11-26 ENCOUNTER — TELEPHONE (OUTPATIENT)
Dept: OBSTETRICS AND GYNECOLOGY | Facility: CLINIC | Age: 49
End: 2024-11-26
Payer: COMMERCIAL

## 2024-11-26 DIAGNOSIS — Z12.31 ENCOUNTER FOR SCREENING MAMMOGRAM FOR MALIGNANT NEOPLASM OF BREAST: Primary | ICD-10-CM

## 2024-12-17 ENCOUNTER — HOSPITAL ENCOUNTER (OUTPATIENT)
Dept: RADIOLOGY | Facility: CLINIC | Age: 49
Discharge: HOME | End: 2024-12-17
Payer: COMMERCIAL

## 2024-12-17 VITALS — BODY MASS INDEX: 30.45 KG/M2 | WEIGHT: 194 LBS | HEIGHT: 67 IN

## 2024-12-17 DIAGNOSIS — Z12.31 ENCOUNTER FOR SCREENING MAMMOGRAM FOR MALIGNANT NEOPLASM OF BREAST: ICD-10-CM

## 2024-12-17 PROCEDURE — 77067 SCR MAMMO BI INCL CAD: CPT

## 2024-12-17 PROCEDURE — 77063 BREAST TOMOSYNTHESIS BI: CPT | Performed by: RADIOLOGY

## 2024-12-17 PROCEDURE — 77067 SCR MAMMO BI INCL CAD: CPT | Performed by: RADIOLOGY

## 2025-01-07 ENCOUNTER — APPOINTMENT (OUTPATIENT)
Dept: OBSTETRICS AND GYNECOLOGY | Facility: CLINIC | Age: 50
End: 2025-01-07
Payer: COMMERCIAL

## 2025-01-07 VITALS — WEIGHT: 196 LBS | SYSTOLIC BLOOD PRESSURE: 112 MMHG | DIASTOLIC BLOOD PRESSURE: 82 MMHG | BODY MASS INDEX: 30.51 KG/M2

## 2025-01-07 DIAGNOSIS — R30.9 URINARY PAIN: ICD-10-CM

## 2025-01-07 DIAGNOSIS — Z78.0 POSTMENOPAUSAL: ICD-10-CM

## 2025-01-07 DIAGNOSIS — Z01.419 ENCOUNTER FOR ANNUAL ROUTINE GYNECOLOGICAL EXAMINATION: Primary | ICD-10-CM

## 2025-01-07 LAB — BLOOD URINE, POC: NEGATIVE

## 2025-01-07 PROCEDURE — 1036F TOBACCO NON-USER: CPT | Performed by: OBSTETRICS & GYNECOLOGY

## 2025-01-07 PROCEDURE — 99396 PREV VISIT EST AGE 40-64: CPT | Performed by: OBSTETRICS & GYNECOLOGY

## 2025-01-07 PROCEDURE — 81002 URINALYSIS NONAUTO W/O SCOPE: CPT | Performed by: OBSTETRICS & GYNECOLOGY

## 2025-01-07 RX ORDER — ESTRADIOL 1 MG/1
1 TABLET ORAL DAILY
Qty: 30 TABLET | Refills: 11 | Status: SHIPPED | OUTPATIENT
Start: 2025-01-07 | End: 2026-01-07

## 2025-01-07 NOTE — PROGRESS NOTES
Subjective   Patient ID: Sandy Phelps is a 49 y.o. female who presents for annual.  HPI 49 years old G0 status post TLH and BS in all last year on estrogen replacement therapy presents for yearly checkup.  She is doing well is happy with her ERT and denies any concern.  She is up-to-date with colon cancer screening with a recent colonoscopy at [facility].  Her mammogram was also done to 3 months ago and was negative.  She denies any changes in her medical history, surgical or allergies.    Review of Systems   All other systems reviewed and are negative.      Objective   Physical Exam  Vitals reviewed.   Constitutional:       Appearance: Normal appearance.   HENT:      Head: Normocephalic and atraumatic.      Nose: Nose normal.   Cardiovascular:      Rate and Rhythm: Normal rate and regular rhythm.   Pulmonary:      Effort: Pulmonary effort is normal.      Breath sounds: Normal breath sounds.   Chest:      Chest wall: No mass.   Breasts:     Right: Normal.      Left: Normal.   Abdominal:      General: Abdomen is flat. Bowel sounds are normal. There is no distension.      Palpations: Abdomen is soft. There is no mass.   Genitourinary:     General: Normal vulva.      Vagina: Normal.      Rectum: Normal.      Comments: No Pap was done  Musculoskeletal:         General: Normal range of motion.      Cervical back: Normal range of motion.   Skin:     General: Skin is warm and dry.   Neurological:      General: No focal deficit present.      Mental Status: She is alert.   Psychiatric:         Mood and Affect: Mood normal.         Behavior: Behavior normal.         Assessment/Plan   Problem List Items Addressed This Visit             ICD-10-CM    Encounter for annual routine gynecological examination - Primary Z01.419     Other Visit Diagnoses         Codes    Postmenopausal     Z78.0    Relevant Medications    estradiol (Estrace) 1 mg tablet        I have encouraged her to do self breast exam monthly eat healthy and  exercise regularly and lose weight.  Her father had bladder cancer and she wanted to have some bladder screening due to recent symptoms of discomfort after she empties her bladder.  I recommended that she has a clean-catch we will check for blood possible urine cytology or culture.  She will continue with calcium and vitamin D supplement.  The refill of estradiol was sent to her pharmacy.  Follow-up yearly.  Dip stick urine negative for blood or leukocytes or nitrates.        Elisa Morales MD 01/07/25 1:27 PM

## 2026-01-13 ENCOUNTER — APPOINTMENT (OUTPATIENT)
Dept: OBSTETRICS AND GYNECOLOGY | Facility: CLINIC | Age: 51
End: 2026-01-13
Payer: COMMERCIAL

## (undated) DEVICE — SCISSOR TIP, ENDOCUT, LAPAROSCOPIC

## (undated) DEVICE — PROTECTOR, NERVE, ULNAR, PINK

## (undated) DEVICE — DRAPE, UNDERBUTTOCKS, W/ 27IN FLUID POUCH

## (undated) DEVICE — TROCAR, KII OPTICAL BLADELESS 5MM Z THREAD 100MM LNGTH

## (undated) DEVICE — SLEEVE, KII, Z-THREAD, 5X100CM

## (undated) DEVICE — DEVICE, SUTURE, ENDOSTITCH, 10 MM

## (undated) DEVICE — DRAPE, LEGGINGS, 48 X 31 IN, STERILE, LF

## (undated) DEVICE — GLOVE, PROTEXIS PI CLASSIC, SZ-6.5, PF, LF

## (undated) DEVICE — DEVICE, FORCE TRIVERSE ELECTROSURGICAL

## (undated) DEVICE — SOLUTION, IRRIGATION, SODIUM CHLORIDE 0.9%, 1000 ML, POUR BOTTLE

## (undated) DEVICE — COVER HANDLE LIGHT, STERIS, BLUE, STERILE

## (undated) DEVICE — DEVICE, KOH-EFFICIENT, RUMI II, 3.0, LF

## (undated) DEVICE — SOLUTION, IRRIGATION, USP, SODIUM CHLORIDE 0.9%, 3000 ML

## (undated) DEVICE — APPLICATOR, CHLORAPREP, W/ORANGE TINT, 26ML

## (undated) DEVICE — SUTURE, VICRYL, 4-0, 18 IN, UNDYED BR PS-2

## (undated) DEVICE — NEEDLE, INSUFFLATION, 13GAX120MM, DISP

## (undated) DEVICE — LIGASURE, V SEALER/DIVIDER  5MM BLUNT TIP

## (undated) DEVICE — TIP, RUMI LAVENDER 5.1MM X 6CM

## (undated) DEVICE — DRESSING, GAUZE, SPONGE, 8 PLY, CURITY, 2 X 2 IN, STERILE

## (undated) DEVICE — ADHESIVE, SKIN, MASTISOL, 2/3 CC VIAL

## (undated) DEVICE — TOWEL PACK, STERILE, 4/PACK, BLUE

## (undated) DEVICE — PAD, GROUNDING, ELECTROSURGICAL, W/9 FT CABLE, POLYHESIVE II, ADULT, LF

## (undated) DEVICE — SWABSTICK, PREP, IODOPHOR, PVP, 8 IN

## (undated) DEVICE — ELECTRODE, ELECTROSURGICAL, LAPAROSCOPIC, L HOOK TIP, 36 IN

## (undated) DEVICE — TUBING, INSUFFLATION, W/ .3 MICRO FILTER & ADAPTER

## (undated) DEVICE — SUTURE, RELOAD, ENDOSTITCH, V-LOC 180, 0 W/LOOPS

## (undated) DEVICE — DRESSING, TRANSPARENT, TEGADERM, 2-3/8 X 2-3/4 IN

## (undated) DEVICE — TROCAR, OPTICAL, BLADELESS, 12MM, THREADED, 100MM LENGTH

## (undated) DEVICE — SYRINGE, 10 CC, LUER LOCK

## (undated) DEVICE — SYRINGE, 30 CC, LUER LOCK

## (undated) DEVICE — STRIP, SKIN CLOSURE, STERI STRIP, REINFORCED, 0.5 X 4 IN